# Patient Record
(demographics unavailable — no encounter records)

---

## 2024-12-12 NOTE — HISTORY OF PRESENT ILLNESS
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 denies any symptoms related.  no changes medically since last visit. I have reviewed all old and new data and available images.

## 2024-12-12 NOTE — PHYSICAL EXAM
[de-identified] : well healed right neck scar [de-identified] : no palpable thyroid nodules [Laryngoscopy Performed] : laryngoscopy was performed, see procedure section for findings [Midline] : located in midline position [de-identified] : bifid uvula [Normal] : orientation to person, place, and time: normal [de-identified] : 1.2 cm mass palpable behind right earlobe

## 2024-12-12 NOTE — ASSESSMENT
[FreeTextEntry1] : will observe. to return earlier if any change. patient has been given the opportunity to ask questions, and all of the patient's questions have been answered to their satisfaction

## 2025-03-17 NOTE — HISTORY OF PRESENT ILLNESS
[FreeTextEntry1] : SHY is a 68 year M w/MHx of Carotid artery disease, Dilated Aorta, COPD, HLD, T2DM, Obesity, Smoker who presents for follow up. Last OV 9/11/2024. Denies chest pain, palpitations, diaphoresis, vision changes, HA, dizziness, syncope, cough, wheezing, SOB/BAR[place], edema, fatigue, fever, chills, infection/UTI SXS.

## 2025-04-10 NOTE — PHYSICAL EXAM
[de-identified] : well healed right neck scar [de-identified] : no palpable thyroid nodules [Laryngoscopy Performed] : laryngoscopy was performed, see procedure section for findings [Midline] : located in midline position [de-identified] : bifid uvula [Normal] : orientation to person, place, and time: normal [de-identified] : 1.2 cm mass palpable behind right earlobe

## 2025-04-10 NOTE — ASSESSMENT
[FreeTextEntry1] : will observe. to return earlier if any change. patient has been given the opportunity to ask questions, and all of the patient's questions have been answered to their satisfaction.  CT next visit. no reason to repeat biopsies performed 5/23

## 2025-04-10 NOTE — HISTORY OF PRESENT ILLNESS
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 denies any symptoms related.  no changes medically since last visit. I have reviewed all old and new data and available images.  returns earlier than scheduled with recent CT angiogram which showed parotid mass.  also with biopsy proven left warthin's tumor

## 2025-05-19 NOTE — ADDENDUM
[FreeTextEntry1] : This note was written by Kiah Gonzales on 05/19/2025 acting as medical scribe for Dr. Jc Garcia. I, Dr. Jc Garcia, have read and attest that all the information, medical decision making and discharge instructions within are true and accurate.

## 2025-05-19 NOTE — HISTORY OF PRESENT ILLNESS
[FreeTextEntry1] : cath f/u [de-identified] : Mr. NICOLE is a 68 year old M with PMHx of Carotid artery disease, Dilated Aorta, COPD, HLD, T2DM, Obesity, Smoker presenting for cath f/u. Pt is s/p cardiac cath on 5/5 with stent placed to RCA. Pt was started on plavix/asa tolerating well no bleeding. Patient feels well at this time, no acute complaints. Denies chest pain, sob, arias, dizziness, diaphoresis, palpitations, LE swelling, orthopnea, syncope, n/v, headache.

## 2025-05-19 NOTE — HEALTH RISK ASSESSMENT
[0] : 2) Feeling down, depressed, or hopeless: Not at all (0) [PHQ-2 Negative - No further assessment needed] : PHQ-2 Negative - No further assessment needed [Current] : Current [[id number]] : 0

## 2025-05-19 NOTE — HISTORY OF PRESENT ILLNESS
[FreeTextEntry1] : cath f/u [de-identified] : Mr. NICOLE is a 68 year old M with PMHx of Carotid artery disease, Dilated Aorta, COPD, HLD, T2DM, Obesity, Smoker presenting for cath f/u. Pt had stent placed in [facility] on 5/5 and is feeling well.  Denies chest pain, sob, arias, dizziness, diaphoresis, palpitations, LE swelling, orthopnea, syncope, n/v, headache.

## 2025-05-19 NOTE — HEALTH RISK ASSESSMENT
[0] : 2) Feeling down, depressed, or hopeless: Not at all (0) [PHQ-2 Negative - No further assessment needed] : PHQ-2 Negative - No further assessment needed [Current] : Current [[id number]] : 0

## 2025-05-19 NOTE — COUNSELING
[Cessation strategies including cessation program discussed] : Cessation strategies including cessation program discussed [Use of nicotine replacement therapies and other medications discussed] : Use of nicotine replacement therapies and other medications discussed [Smoking Cessation Program Referral] : Smoking Cessation Program Referral  [Yes] : Willing to quit smoking [FreeTextEntry1] : 4

## 2025-05-19 NOTE — HEALTH RISK ASSESSMENT
[0] : 2) Feeling down, depressed, or hopeless: Not at all (0) [PHQ-2 Negative - No further assessment needed] : PHQ-2 Negative - No further assessment needed [Current] : Current [[id number]] : 0

## 2025-05-19 NOTE — HISTORY OF PRESENT ILLNESS
[FreeTextEntry1] : cath f/u [de-identified] : Mr. NICOLE is a 68 year old M with PMHx of Carotid artery disease, Dilated Aorta, COPD, HLD, T2DM, Obesity, Smoker presenting for cath f/u. Pt had stent placed in [facility] on 5/5 and is feeling well.  Denies chest pain, sob, arias, dizziness, diaphoresis, palpitations, LE swelling, orthopnea, syncope, n/v, headache.

## 2025-05-19 NOTE — HEALTH RISK ASSESSMENT
[0] : 2) Feeling down, depressed, or hopeless: Not at all (0) [PHQ-2 Negative - No further assessment needed] : PHQ-2 Negative - No further assessment needed [Current] : Current [[id number]] : 0

## 2025-05-19 NOTE — PHYSICAL EXAM
[No Acute Distress] : no acute distress [Well Nourished] : well nourished [Well Developed] : well developed [Well-Appearing] : well-appearing [Normal Sclera/Conjunctiva] : normal sclera/conjunctiva [PERRL] : pupils equal round and reactive to light [EOMI] : extraocular movements intact [Normal Outer Ear/Nose] : the outer ears and nose were normal in appearance [Normal Oropharynx] : the oropharynx was normal [No JVD] : no jugular venous distention [No Lymphadenopathy] : no lymphadenopathy [Supple] : supple [Thyroid Normal, No Nodules] : the thyroid was normal and there were no nodules present [No Respiratory Distress] : no respiratory distress  [No Accessory Muscle Use] : no accessory muscle use [Clear to Auscultation] : lungs were clear to auscultation bilaterally [Normal Rate] : normal rate  [Regular Rhythm] : with a regular rhythm [Normal S1, S2] : normal S1 and S2 [No Murmur] : no murmur heard [No Carotid Bruits] : no carotid bruits [No Varicosities] : no varicosities [Pedal Pulses Present] : the pedal pulses are present [No Edema] : there was no peripheral edema [No Extremity Clubbing/Cyanosis] : no extremity clubbing/cyanosis [Soft] : abdomen soft [Non Tender] : non-tender [Non-distended] : non-distended [Normal Bowel Sounds] : normal bowel sounds [Normal Posterior Cervical Nodes] : no posterior cervical lymphadenopathy [Normal Anterior Cervical Nodes] : no anterior cervical lymphadenopathy [No CVA Tenderness] : no CVA  tenderness [No Spinal Tenderness] : no spinal tenderness [No Joint Swelling] : no joint swelling [Grossly Normal Strength/Tone] : grossly normal strength/tone [No Rash] : no rash [Coordination Grossly Intact] : coordination grossly intact [No Focal Deficits] : no focal deficits [Normal Gait] : normal gait [Normal Affect] : the affect was normal [Normal Insight/Judgement] : insight and judgment were intact [de-identified] : rp 2+

## 2025-05-19 NOTE — PHYSICAL EXAM
[No Acute Distress] : no acute distress [Well Nourished] : well nourished [Well Developed] : well developed [Well-Appearing] : well-appearing [Normal Sclera/Conjunctiva] : normal sclera/conjunctiva [PERRL] : pupils equal round and reactive to light [EOMI] : extraocular movements intact [Normal Outer Ear/Nose] : the outer ears and nose were normal in appearance [Normal Oropharynx] : the oropharynx was normal [No JVD] : no jugular venous distention [No Lymphadenopathy] : no lymphadenopathy [Supple] : supple [Thyroid Normal, No Nodules] : the thyroid was normal and there were no nodules present [No Respiratory Distress] : no respiratory distress  [No Accessory Muscle Use] : no accessory muscle use [Clear to Auscultation] : lungs were clear to auscultation bilaterally [Normal Rate] : normal rate  [Regular Rhythm] : with a regular rhythm [Normal S1, S2] : normal S1 and S2 [No Murmur] : no murmur heard [No Carotid Bruits] : no carotid bruits [No Varicosities] : no varicosities [Pedal Pulses Present] : the pedal pulses are present [No Edema] : there was no peripheral edema [No Extremity Clubbing/Cyanosis] : no extremity clubbing/cyanosis [Soft] : abdomen soft [Non Tender] : non-tender [Non-distended] : non-distended [Normal Bowel Sounds] : normal bowel sounds [Normal Posterior Cervical Nodes] : no posterior cervical lymphadenopathy [Normal Anterior Cervical Nodes] : no anterior cervical lymphadenopathy [No CVA Tenderness] : no CVA  tenderness [No Spinal Tenderness] : no spinal tenderness [No Joint Swelling] : no joint swelling [Grossly Normal Strength/Tone] : grossly normal strength/tone [No Rash] : no rash [Coordination Grossly Intact] : coordination grossly intact [No Focal Deficits] : no focal deficits [Normal Gait] : normal gait [Normal Affect] : the affect was normal [Normal Insight/Judgement] : insight and judgment were intact [de-identified] : rp 2+

## 2025-05-19 NOTE — HISTORY OF PRESENT ILLNESS
[FreeTextEntry1] : cath f/u [de-identified] : Mr. NICOLE is a 68 year old M with PMHx of Carotid artery disease, Dilated Aorta, COPD, HLD, T2DM, Obesity, Smoker presenting for cath f/u. Pt is s/p cardiac cath on 5/5 with stent placed to RCA. Pt was started on plavix/asa tolerating well no bleeding. Patient feels well at this time, no acute complaints. Denies chest pain, sob, arias, dizziness, diaphoresis, palpitations, LE swelling, orthopnea, syncope, n/v, headache.